# Patient Record
Sex: FEMALE | Race: BLACK OR AFRICAN AMERICAN | NOT HISPANIC OR LATINO | Employment: OTHER
[De-identification: names, ages, dates, MRNs, and addresses within clinical notes are randomized per-mention and may not be internally consistent; named-entity substitution may affect disease eponyms.]

---

## 2017-01-01 ENCOUNTER — PATIENT OUTREACH (OUTPATIENT)
Dept: CASE MANAGEMENT | Facility: OTHER | Age: 62
End: 2017-01-01

## 2017-01-01 ENCOUNTER — OFFICE VISIT (OUTPATIENT)
Dept: INTERNAL MEDICINE | Facility: CLINIC | Age: 62
End: 2017-01-01

## 2017-01-01 ENCOUNTER — HOSPITAL ENCOUNTER (EMERGENCY)
Facility: HOSPITAL | Age: 62
Discharge: ANOTHER HOSPITAL | End: 2017-10-06
Attending: EMERGENCY MEDICINE | Admitting: EMERGENCY MEDICINE

## 2017-01-01 ENCOUNTER — APPOINTMENT (OUTPATIENT)
Dept: GENERAL RADIOLOGY | Facility: HOSPITAL | Age: 62
End: 2017-01-01

## 2017-01-01 ENCOUNTER — EPISODE CHANGES (OUTPATIENT)
Dept: CASE MANAGEMENT | Facility: OTHER | Age: 62
End: 2017-01-01

## 2017-01-01 ENCOUNTER — TELEPHONE (OUTPATIENT)
Dept: INTERNAL MEDICINE | Facility: CLINIC | Age: 62
End: 2017-01-01

## 2017-01-01 VITALS
HEART RATE: 97 BPM | OXYGEN SATURATION: 99 % | TEMPERATURE: 98.4 F | DIASTOLIC BLOOD PRESSURE: 91 MMHG | SYSTOLIC BLOOD PRESSURE: 134 MMHG | RESPIRATION RATE: 18 BRPM | WEIGHT: 145 LBS | HEIGHT: 60 IN | BODY MASS INDEX: 28.47 KG/M2

## 2017-01-01 VITALS
OXYGEN SATURATION: 99 % | SYSTOLIC BLOOD PRESSURE: 122 MMHG | DIASTOLIC BLOOD PRESSURE: 68 MMHG | BODY MASS INDEX: 25.58 KG/M2 | TEMPERATURE: 99.8 F | WEIGHT: 131 LBS | HEART RATE: 105 BPM

## 2017-01-01 VITALS
HEART RATE: 98 BPM | SYSTOLIC BLOOD PRESSURE: 124 MMHG | BODY MASS INDEX: 25.58 KG/M2 | DIASTOLIC BLOOD PRESSURE: 86 MMHG | OXYGEN SATURATION: 99 % | WEIGHT: 131 LBS

## 2017-01-01 VITALS
OXYGEN SATURATION: 99 % | DIASTOLIC BLOOD PRESSURE: 92 MMHG | WEIGHT: 137.6 LBS | HEART RATE: 95 BPM | BODY MASS INDEX: 25.17 KG/M2 | SYSTOLIC BLOOD PRESSURE: 120 MMHG

## 2017-01-01 DIAGNOSIS — J45.30 MILD PERSISTENT ASTHMA WITHOUT COMPLICATION: ICD-10-CM

## 2017-01-01 DIAGNOSIS — IMO0002 UNCONTROLLED TYPE 2 DIABETES MELLITUS WITH OTHER SPECIFIED COMPLICATION, WITHOUT LONG-TERM CURRENT USE OF INSULIN: Primary | ICD-10-CM

## 2017-01-01 DIAGNOSIS — IMO0002 UNCONTROLLED TYPE 2 DIABETES MELLITUS WITH COMPLICATION, WITH LONG-TERM CURRENT USE OF INSULIN: ICD-10-CM

## 2017-01-01 DIAGNOSIS — Z00.00 MEDICARE ANNUAL WELLNESS VISIT, SUBSEQUENT: Primary | ICD-10-CM

## 2017-01-01 DIAGNOSIS — E78.2 MIXED HYPERLIPIDEMIA: ICD-10-CM

## 2017-01-01 DIAGNOSIS — N18.3 ACUTE RENAL FAILURE SUPERIMPOSED ON STAGE 3 CHRONIC KIDNEY DISEASE, UNSPECIFIED ACUTE RENAL FAILURE TYPE: ICD-10-CM

## 2017-01-01 DIAGNOSIS — Z94.1 STATUS POST HEART TRANSPLANT (HCC): ICD-10-CM

## 2017-01-01 DIAGNOSIS — N17.9 ACUTE RENAL FAILURE SUPERIMPOSED ON STAGE 3 CHRONIC KIDNEY DISEASE, UNSPECIFIED ACUTE RENAL FAILURE TYPE: ICD-10-CM

## 2017-01-01 DIAGNOSIS — G43.711 INTRACTABLE CHRONIC MIGRAINE WITHOUT AURA AND WITH STATUS MIGRAINOSUS: ICD-10-CM

## 2017-01-01 DIAGNOSIS — J18.9 PNEUMONIA OF BOTH LUNGS DUE TO INFECTIOUS ORGANISM, UNSPECIFIED PART OF LUNG: Primary | ICD-10-CM

## 2017-01-01 DIAGNOSIS — Z23 FLU VACCINE NEED: ICD-10-CM

## 2017-01-01 DIAGNOSIS — J40 BRONCHITIS: ICD-10-CM

## 2017-01-01 DIAGNOSIS — I50.9 ACUTE CONGESTIVE HEART FAILURE, UNSPECIFIED CONGESTIVE HEART FAILURE TYPE: ICD-10-CM

## 2017-01-01 DIAGNOSIS — I10 BENIGN ESSENTIAL HYPERTENSION: ICD-10-CM

## 2017-01-01 DIAGNOSIS — J01.10 ACUTE FRONTAL SINUSITIS, RECURRENCE NOT SPECIFIED: Primary | ICD-10-CM

## 2017-01-01 DIAGNOSIS — R68.89 FLU-LIKE SYMPTOMS: ICD-10-CM

## 2017-01-01 LAB
ALBUMIN SERPL-MCNC: 4.1 G/DL (ref 3.2–4.8)
ALBUMIN SERPL-MCNC: 4.8 G/DL (ref 3.2–4.8)
ALBUMIN/GLOB SERPL: 1.5 G/DL (ref 1.5–2.5)
ALBUMIN/GLOB SERPL: 1.7 G/DL (ref 1.5–2.5)
ALP SERPL-CCNC: 469 U/L (ref 25–100)
ALP SERPL-CCNC: 485 U/L (ref 25–100)
ALT SERPL W P-5'-P-CCNC: 43 U/L (ref 7–40)
ALT SERPL W P-5'-P-CCNC: 8 U/L (ref 7–40)
ANION GAP SERPL CALCULATED.3IONS-SCNC: 7 MMOL/L (ref 3–11)
ANION GAP SERPL CALCULATED.3IONS-SCNC: 8 MMOL/L (ref 3–11)
ARTICHOKE IGE QN: 66 MG/DL (ref 0–130)
AST SERPL-CCNC: 16 U/L (ref 0–33)
AST SERPL-CCNC: 31 U/L (ref 0–33)
BACTERIA SPEC AEROBE CULT: NORMAL
BACTERIA SPEC AEROBE CULT: NORMAL
BASOPHILS # BLD AUTO: 0.01 10*3/MM3 (ref 0–0.2)
BASOPHILS NFR BLD AUTO: 0.2 % (ref 0–1)
BILIRUB SERPL-MCNC: 0.4 MG/DL (ref 0.3–1.2)
BILIRUB SERPL-MCNC: 0.6 MG/DL (ref 0.3–1.2)
BNP SERPL-MCNC: 691 PG/ML (ref 0–100)
BUN BLD-MCNC: 23 MG/DL (ref 9–23)
BUN BLD-MCNC: 56 MG/DL (ref 9–23)
BUN/CREAT SERPL: 11.5 (ref 7–25)
BUN/CREAT SERPL: 31.1 (ref 7–25)
BURR CELLS BLD QL SMEAR: NORMAL
CALCIUM SPEC-SCNC: 8.9 MG/DL (ref 8.7–10.4)
CALCIUM SPEC-SCNC: 9 MG/DL (ref 8.7–10.4)
CHLORIDE SERPL-SCNC: 107 MMOL/L (ref 99–109)
CHLORIDE SERPL-SCNC: 109 MMOL/L (ref 99–109)
CHOLEST SERPL-MCNC: 192 MG/DL (ref 0–200)
CO2 SERPL-SCNC: 22 MMOL/L (ref 20–31)
CO2 SERPL-SCNC: 29 MMOL/L (ref 20–31)
CREAT BLD-MCNC: 1.8 MG/DL (ref 0.6–1.3)
CREAT BLD-MCNC: 2 MG/DL (ref 0.6–1.3)
D-LACTATE SERPL-SCNC: 0.7 MMOL/L (ref 0.5–2)
DEPRECATED RDW RBC AUTO: 52.3 FL (ref 37–54)
EOSINOPHIL # BLD AUTO: 0.42 10*3/MM3 (ref 0–0.3)
EOSINOPHIL NFR BLD AUTO: 7.4 % (ref 0–3)
ERYTHROCYTE [DISTWIDTH] IN BLOOD BY AUTOMATED COUNT: 15.4 % (ref 11.3–14.5)
EXPIRATION DATE: NORMAL
FLUAV AG NPH QL: NEGATIVE
FLUBV AG NPH QL: NEGATIVE
GFR SERPL CREATININE-BSD FRML MDRD: 31 ML/MIN/1.73
GFR SERPL CREATININE-BSD FRML MDRD: 35 ML/MIN/1.73
GLOBULIN UR ELPH-MCNC: 2.8 GM/DL
GLOBULIN UR ELPH-MCNC: 2.9 GM/DL
GLUCOSE BLD-MCNC: 126 MG/DL (ref 70–100)
GLUCOSE BLD-MCNC: 156 MG/DL (ref 70–100)
HBA1C MFR BLD: 7.7 %
HBA1C MFR BLD: 8.6 % (ref 4.8–5.6)
HCT VFR BLD AUTO: 33.7 % (ref 34.5–44)
HDLC SERPL-MCNC: 101 MG/DL (ref 40–60)
HGB BLD-MCNC: 10 G/DL (ref 11.5–15.5)
HOLD SPECIMEN: NORMAL
HOLD SPECIMEN: NORMAL
HYPOCHROMIA BLD QL: NORMAL
IMM GRANULOCYTES # BLD: 0.01 10*3/MM3 (ref 0–0.03)
IMM GRANULOCYTES NFR BLD: 0.2 % (ref 0–0.6)
INTERNAL CONTROL: NORMAL
LYMPHOCYTES # BLD AUTO: 0.4 10*3/MM3 (ref 0.6–4.8)
LYMPHOCYTES NFR BLD AUTO: 7 % (ref 24–44)
Lab: NORMAL
MCH RBC QN AUTO: 27.2 PG (ref 27–31)
MCHC RBC AUTO-ENTMCNC: 29.7 G/DL (ref 32–36)
MCV RBC AUTO: 91.8 FL (ref 80–99)
MONOCYTES # BLD AUTO: 0.97 10*3/MM3 (ref 0–1)
MONOCYTES NFR BLD AUTO: 17 % (ref 0–12)
NEUTROPHILS # BLD AUTO: 3.89 10*3/MM3 (ref 1.5–8.3)
NEUTROPHILS NFR BLD AUTO: 68.2 % (ref 41–71)
OVALOCYTES BLD QL SMEAR: NORMAL
PLAT MORPH BLD: NORMAL
PLATELET # BLD AUTO: 193 10*3/MM3 (ref 150–450)
PMV BLD AUTO: 10.8 FL (ref 6–12)
POTASSIUM BLD-SCNC: 4.4 MMOL/L (ref 3.5–5.5)
POTASSIUM BLD-SCNC: 5.1 MMOL/L (ref 3.5–5.5)
PROT SERPL-MCNC: 6.9 G/DL (ref 5.7–8.2)
PROT SERPL-MCNC: 7.7 G/DL (ref 5.7–8.2)
RBC # BLD AUTO: 3.67 10*6/MM3 (ref 3.89–5.14)
SODIUM BLD-SCNC: 138 MMOL/L (ref 132–146)
SODIUM BLD-SCNC: 144 MMOL/L (ref 132–146)
TRIGL SERPL-MCNC: 76 MG/DL (ref 0–150)
TROPONIN I SERPL-MCNC: 0.01 NG/ML (ref 0–0.07)
WBC MORPH BLD: NORMAL
WBC NRBC COR # BLD: 5.7 10*3/MM3 (ref 3.5–10.8)
WHOLE BLOOD HOLD SPECIMEN: NORMAL
WHOLE BLOOD HOLD SPECIMEN: NORMAL

## 2017-01-01 PROCEDURE — 99213 OFFICE O/P EST LOW 20 MIN: CPT | Performed by: PHYSICIAN ASSISTANT

## 2017-01-01 PROCEDURE — 90686 IIV4 VACC NO PRSV 0.5 ML IM: CPT | Performed by: INTERNAL MEDICINE

## 2017-01-01 PROCEDURE — 84484 ASSAY OF TROPONIN QUANT: CPT

## 2017-01-01 PROCEDURE — G0008 ADMIN INFLUENZA VIRUS VAC: HCPCS | Performed by: INTERNAL MEDICINE

## 2017-01-01 PROCEDURE — 83036 HEMOGLOBIN GLYCOSYLATED A1C: CPT | Performed by: INTERNAL MEDICINE

## 2017-01-01 PROCEDURE — 36415 COLL VENOUS BLD VENIPUNCTURE: CPT

## 2017-01-01 PROCEDURE — 83605 ASSAY OF LACTIC ACID: CPT | Performed by: EMERGENCY MEDICINE

## 2017-01-01 PROCEDURE — G0439 PPPS, SUBSEQ VISIT: HCPCS | Performed by: INTERNAL MEDICINE

## 2017-01-01 PROCEDURE — 96372 THER/PROPH/DIAG INJ SC/IM: CPT | Performed by: PHYSICIAN ASSISTANT

## 2017-01-01 PROCEDURE — 96365 THER/PROPH/DIAG IV INF INIT: CPT

## 2017-01-01 PROCEDURE — 25010000002 ONDANSETRON PER 1 MG: Performed by: EMERGENCY MEDICINE

## 2017-01-01 PROCEDURE — 85025 COMPLETE CBC W/AUTO DIFF WBC: CPT | Performed by: EMERGENCY MEDICINE

## 2017-01-01 PROCEDURE — G0444 DEPRESSION SCREEN ANNUAL: HCPCS | Performed by: INTERNAL MEDICINE

## 2017-01-01 PROCEDURE — 87040 BLOOD CULTURE FOR BACTERIA: CPT | Performed by: EMERGENCY MEDICINE

## 2017-01-01 PROCEDURE — 93005 ELECTROCARDIOGRAM TRACING: CPT

## 2017-01-01 PROCEDURE — 96368 THER/DIAG CONCURRENT INF: CPT

## 2017-01-01 PROCEDURE — 96372 THER/PROPH/DIAG INJ SC/IM: CPT | Performed by: INTERNAL MEDICINE

## 2017-01-01 PROCEDURE — 80053 COMPREHEN METABOLIC PANEL: CPT | Performed by: INTERNAL MEDICINE

## 2017-01-01 PROCEDURE — 71010 HC CHEST PA OR AP: CPT

## 2017-01-01 PROCEDURE — 25010000002 PIPERACILLIN-TAZOBACTAM: Performed by: EMERGENCY MEDICINE

## 2017-01-01 PROCEDURE — 99213 OFFICE O/P EST LOW 20 MIN: CPT | Performed by: INTERNAL MEDICINE

## 2017-01-01 PROCEDURE — 80061 LIPID PANEL: CPT | Performed by: INTERNAL MEDICINE

## 2017-01-01 PROCEDURE — 96361 HYDRATE IV INFUSION ADD-ON: CPT

## 2017-01-01 PROCEDURE — 94640 AIRWAY INHALATION TREATMENT: CPT

## 2017-01-01 PROCEDURE — 25010000002 AZITHROMYCIN: Performed by: EMERGENCY MEDICINE

## 2017-01-01 PROCEDURE — 25010000002 HYDROCORTISONE SODIUM SUCCINATE 100 MG RECONSTITUTED SOLUTION: Performed by: EMERGENCY MEDICINE

## 2017-01-01 PROCEDURE — 87804 INFLUENZA ASSAY W/OPTIC: CPT | Performed by: PHYSICIAN ASSISTANT

## 2017-01-01 PROCEDURE — 96375 TX/PRO/DX INJ NEW DRUG ADDON: CPT

## 2017-01-01 PROCEDURE — 99284 EMERGENCY DEPT VISIT MOD MDM: CPT

## 2017-01-01 PROCEDURE — 85007 BL SMEAR W/DIFF WBC COUNT: CPT | Performed by: EMERGENCY MEDICINE

## 2017-01-01 PROCEDURE — 83880 ASSAY OF NATRIURETIC PEPTIDE: CPT | Performed by: EMERGENCY MEDICINE

## 2017-01-01 PROCEDURE — 80053 COMPREHEN METABOLIC PANEL: CPT | Performed by: EMERGENCY MEDICINE

## 2017-01-01 RX ORDER — IPRATROPIUM BROMIDE AND ALBUTEROL SULFATE 2.5; .5 MG/3ML; MG/3ML
3 SOLUTION RESPIRATORY (INHALATION) ONCE
Status: COMPLETED | OUTPATIENT
Start: 2017-01-01 | End: 2017-01-01

## 2017-01-01 RX ORDER — LANCETS 28 GAUGE
1 EACH MISCELLANEOUS 2 TIMES DAILY
Qty: 200 EACH | Refills: 3 | Status: SHIPPED | OUTPATIENT
Start: 2017-01-01 | End: 2017-01-01 | Stop reason: SDUPTHER

## 2017-01-01 RX ORDER — ALBUTEROL SULFATE 90 UG/1
2 AEROSOL, METERED RESPIRATORY (INHALATION) EVERY 6 HOURS PRN
Qty: 18 G | Refills: 0 | Status: SHIPPED | OUTPATIENT
Start: 2017-01-01

## 2017-01-01 RX ORDER — AZITHROMYCIN 250 MG/1
TABLET, FILM COATED ORAL
Qty: 6 TABLET | Refills: 0 | Status: SHIPPED | OUTPATIENT
Start: 2017-01-01

## 2017-01-01 RX ORDER — ONDANSETRON 2 MG/ML
4 INJECTION INTRAMUSCULAR; INTRAVENOUS ONCE
Status: COMPLETED | OUTPATIENT
Start: 2017-01-01 | End: 2017-01-01

## 2017-01-01 RX ORDER — BLOOD-GLUCOSE METER
KIT MISCELLANEOUS
Qty: 1 EACH | Refills: 2 | Status: SHIPPED | OUTPATIENT
Start: 2017-01-01

## 2017-01-01 RX ORDER — SODIUM CHLORIDE 0.9 % (FLUSH) 0.9 %
10 SYRINGE (ML) INJECTION AS NEEDED
Status: DISCONTINUED | OUTPATIENT
Start: 2017-01-01 | End: 2017-01-01 | Stop reason: HOSPADM

## 2017-01-01 RX ORDER — VANCOMYCIN HYDROCHLORIDE
20 ONCE
Status: DISCONTINUED | OUTPATIENT
Start: 2017-01-01 | End: 2017-01-01 | Stop reason: HOSPADM

## 2017-01-01 RX ORDER — METHYLPREDNISOLONE ACETATE 40 MG/ML
40 INJECTION, SUSPENSION INTRA-ARTICULAR; INTRALESIONAL; INTRAMUSCULAR; SOFT TISSUE ONCE
Status: COMPLETED | OUTPATIENT
Start: 2017-01-01 | End: 2017-01-01

## 2017-01-01 RX ORDER — SIROLIMUS 1 MG/1
2 TABLET, FILM COATED ORAL DAILY
COMMUNITY

## 2017-01-01 RX ORDER — SODIUM CHLORIDE 9 MG/ML
125 INJECTION, SOLUTION INTRAVENOUS CONTINUOUS
Status: DISCONTINUED | OUTPATIENT
Start: 2017-01-01 | End: 2017-01-01 | Stop reason: HOSPADM

## 2017-01-01 RX ORDER — LANCETS 28 GAUGE
1 EACH MISCELLANEOUS 2 TIMES DAILY
Qty: 200 EACH | Refills: 3 | Status: SHIPPED | OUTPATIENT
Start: 2017-01-01

## 2017-01-01 RX ORDER — MULTIVITAMIN WITH IRON
1 TABLET ORAL DAILY
Qty: 30 EACH | Refills: 1 | Status: SHIPPED | OUTPATIENT
Start: 2017-01-01

## 2017-01-01 RX ADMIN — METHYLPREDNISOLONE ACETATE 40 MG: 40 INJECTION, SUSPENSION INTRA-ARTICULAR; INTRALESIONAL; INTRAMUSCULAR; SOFT TISSUE at 14:40

## 2017-01-01 RX ADMIN — AZITHROMYCIN 500 MG: 500 INJECTION, POWDER, LYOPHILIZED, FOR SOLUTION INTRAVENOUS at 18:11

## 2017-01-01 RX ADMIN — TAZOBACTAM SODIUM AND PIPERACILLIN SODIUM 4.5 G: .5; 4 INJECTION, POWDER, LYOPHILIZED, FOR SOLUTION INTRAVENOUS at 17:35

## 2017-01-01 RX ADMIN — SODIUM CHLORIDE 125 ML/HR: 9 INJECTION, SOLUTION INTRAVENOUS at 16:41

## 2017-01-01 RX ADMIN — ONDANSETRON 4 MG: 2 INJECTION INTRAMUSCULAR; INTRAVENOUS at 16:37

## 2017-01-01 RX ADMIN — HYDROCORTISONE SODIUM SUCCINATE 100 MG: 100 INJECTION, POWDER, FOR SOLUTION INTRAMUSCULAR; INTRAVENOUS at 16:41

## 2017-01-01 RX ADMIN — IPRATROPIUM BROMIDE AND ALBUTEROL SULFATE 3 ML: .5; 3 SOLUTION RESPIRATORY (INHALATION) at 16:09

## 2017-01-01 RX ADMIN — SODIUM CHLORIDE 500 ML: 9 INJECTION, SOLUTION INTRAVENOUS at 16:42

## 2017-01-01 RX ADMIN — METHYLPREDNISOLONE ACETATE 40 MG: 40 INJECTION, SUSPENSION INTRA-ARTICULAR; INTRALESIONAL; INTRAMUSCULAR; SOFT TISSUE at 15:36

## 2017-07-11 NOTE — PROGRESS NOTES
Diabetes    Subjective   Pamella Henson is a 62 y.o. female is here today for follow-up.    History of Present Illness   Has had LHC and stents as she was fatigued and having left sided pain, 2 stents this year, and 1 stent last year.  Having a HA x 2 days, not getting better- rt side of the face behind the eye.  Issues with allergies also.    Current Outpatient Prescriptions:   •  albuterol (PROVENTIL HFA;VENTOLIN HFA) 108 (90 BASE) MCG/ACT inhaler, Inhale 2 puffs every 6 (six) hours as needed for wheezing., Disp: 18 g, Rfl: 0  •  amLODIPine (NORVASC) 5 MG tablet, Take  by mouth., Disp: , Rfl:   •  aspirin (ASPIRIN EC LO-DOSE) 81 MG EC tablet, Take  by mouth., Disp: , Rfl:   •  azelastine (ASTELIN) 0.1 % nasal spray, into each nostril., Disp: , Rfl:   •  bumetanide (BUMEX) 1 MG tablet, Take  by mouth., Disp: , Rfl:   •  Cholecalciferol (VITAMIN D3) 5000 UNITS capsule capsule, Take 1 capsule by mouth Daily., Disp: 30 capsule, Rfl: 5  •  clopidogrel (PLAVIX) 75 MG tablet, Take 75 mg by mouth daily., Disp: , Rfl:   •  estradiol (VIVELLE-DOT) 0.0375 MG/24HR, Place  on the skin., Disp: , Rfl:   •  fluticasone (FLONASE) 50 MCG/ACT nasal spray, into each nostril., Disp: , Rfl:   •  fluticasone-salmeterol (ADVAIR DISKUS) 250-50 MCG/DOSE DISKUS, every 12 (twelve) hours., Disp: , Rfl:   •  glucose blood test strip, FreeStyle Test In Vitro Strip; Patient Sig: FreeStyle Test In Vitro Strip TEST THREE TIMES A DAY **dx code E11.55**; 400; 0; 18-Dec-2012; Active; DX CODE --  E11.65, Disp: , Rfl:   •  Insulin Glargine (LANTUS SOLOSTAR) 100 UNIT/ML injection pen, Inject 36 Units under the skin every night., Disp: 4 pen, Rfl: 5  •  Insulin Lispro, Human, (HUMALOG KWIKPEN) 100 UNIT/ML solution pen-injector, Inject  under the skin., Disp: , Rfl:   •  Insulin Pen Needle (CLICKFINE PEN NEEDLES) 31G X 6 MM misc, , Disp: , Rfl:   •  Lancets (FREESTYLE) lancets, , Disp: , Rfl:   •  lansoprazole (PREVACID) 30 MG capsule, Take  by mouth  daily., Disp: , Rfl:   •  metoprolol tartrate (LOPRESSOR) 100 MG tablet, Take 100 mg by mouth 2 (two) times a day., Disp: , Rfl:   •  montelukast (SINGULAIR) 10 MG tablet, Take  by mouth., Disp: , Rfl:   •  potassium chloride (K-DUR,KLOR-CON) 20 MEQ CR tablet, Take  by mouth., Disp: , Rfl:   •  predniSONE (DELTASONE) 2.5 MG tablet, Take  by mouth daily., Disp: , Rfl:   •  promethazine (PHENERGAN) 25 MG tablet, Take  by mouth., Disp: , Rfl:   •  tacrolimus (PROGRAF) 1 MG capsule, Take  by mouth., Disp: , Rfl:   •  Magnesium 250 MG tablet, Take 1 tablet by mouth Daily., Disp: 30 each, Rfl: 1  •  Riboflavin 100 MG capsule, Take 1 capsule by mouth Daily., Disp: 30 each, Rfl: 2    Current Facility-Administered Medications:   •  methylPREDNISolone acetate (DEPO-medrol) injection 40 mg, 40 mg, Intramuscular, Once, Abbey Blanca MD      The following portions of the patient's history were reviewed and updated as appropriate: allergies, current medications, past family history, past medical history, past social history, past surgical history and problem list.    Review of Systems   Constitutional: Positive for fatigue. Negative for chills and fever.   HENT: Negative for ear discharge, ear pain, sinus pressure and sore throat.    Respiratory: Negative for cough, chest tightness and shortness of breath.    Cardiovascular: Negative for chest pain, palpitations and leg swelling.   Gastrointestinal: Negative for diarrhea, nausea and vomiting.   Musculoskeletal: Negative for arthralgias, back pain and myalgias.   Neurological: Positive for headaches. Negative for dizziness and syncope.   Psychiatric/Behavioral: Negative for confusion and sleep disturbance.       Objective   /92  Pulse 95  Wt 137 lb 9.6 oz (62.4 kg)  SpO2 99% Comment: ra  BMI 25.17 kg/m2  Physical Exam   Constitutional: She is oriented to person, place, and time. She appears well-developed and well-nourished.   HENT:   Head: Normocephalic and  atraumatic.   Mouth/Throat: No oropharyngeal exudate.   Eyes: Conjunctivae are normal. Pupils are equal, round, and reactive to light.   Neck: Neck supple. No thyromegaly present.   Cardiovascular: Normal rate and regular rhythm.    Pulmonary/Chest: Effort normal and breath sounds normal.   Abdominal: Soft. Bowel sounds are normal. She exhibits no distension. There is no tenderness.   Musculoskeletal: She exhibits edema.   Neurological: She is alert and oriented to person, place, and time.   Skin: Skin is warm and dry.   Psychiatric: She has a normal mood and affect. Judgment normal.   Nursing note and vitals reviewed.        Results for orders placed or performed in visit on 07/11/17   POC Glycosylated Hemoglobin (Hb A1C)   Result Value Ref Range    Hemoglobin A1C 7.7 %             Assessment/Plan   Diagnoses and all orders for this visit:    Uncontrolled type 2 diabetes mellitus with other specified complication, without long-term current use of insulin  -     POC Glycosylated Hemoglobin (Hb A1C)    Intractable chronic migraine without aura and with status migrainosus  -     methylPREDNISolone acetate (DEPO-medrol) injection 40 mg; Inject 1 mL into the shoulder, thigh, or buttocks 1 (One) Time.  -     Magnesium 250 MG tablet; Take 1 tablet by mouth Daily.  -     Riboflavin 100 MG capsule; Take 1 capsule by mouth Daily.  -     Cholecalciferol (VITAMIN D3) 5000 UNITS capsule capsule; Take 1 capsule by mouth Daily.             Labs per [facility].    Return in about 4 months (around 11/11/2017) for Medicare Wellness.

## 2017-07-12 NOTE — TELEPHONE ENCOUNTER
ELY OLMEDO WITH [facility] CALLED STATING THAT THEY NEED A NEW RX FOR TEST STRIPS AND LANCETS WITH THE DX CODE ON THE HARD COPY PER MEDICARE SENT TO THEM.

## 2017-10-06 NOTE — ED PROVIDER NOTES
Subjective   HPI Comments: Pamella Henson is a 62 y.o. pleasant female who presents to the ED with complaints of shortness of breath and cough that began 3-4 days ago. She reports that for the last 3-4 nava she has been feeling SoA and weak with a cough and headache so she visited a [facility] two days ago, where they stated that she showed signs of a URI and she has been taking Zithromax and Perles since then. Upon returning home, she states that she has not felt any better, and could not sleep last night. She also states that the last time she was able to ambulate normally in a grocery store was 5 days ago. She also complains of chills, a decreased appetite, a non-productive cough, rhinorhea, and vomiting, but denies diarrhea, abdominal pain, ankle pain, and changes in weight. She has a history of heart failure, cardiac stents, and had a heart transplant 14 years ago. She has no hx of sleep apnea. Additionally, she does no wear oxygen at home. No other acute complaints at this time.    Patient is a 62 y.o. female presenting with shortness of breath.   History provided by:  Patient and relative  Shortness of Breath   Severity:  Moderate  Onset quality:  Gradual  Duration:  4 days  Timing:  Constant  Progression:  Unchanged  Chronicity:  New  Relieved by:  Nothing  Ineffective treatments: Zithromax and Perles.  Associated symptoms: cough, headaches and vomiting    Associated symptoms: no abdominal pain and no fever    Cough:     Severity:  Moderate    Onset quality:  Sudden    Duration:  4 days    Timing:  Constant    Progression:  Unchanged    Chronicity:  New  Headaches:     Severity:  Mild    Onset quality:  Gradual    Duration:  4 days    Timing:  Constant    Progression:  Unchanged  Vomiting:     Severity:  Moderate    Duration:  1 day    Timing:  Constant    Progression:  Unchanged  Risk factors comment:  Heart transplant 14 years ago    Review of past records  4:05 PM  Ms. Henson visited the ED on the 4th Of October  - [facility] -Negative Flu swab    Review of Systems   Constitutional: Positive for appetite change (Decreased appetite) and chills. Negative for fever and unexpected weight change.   HENT: Positive for rhinorrhea.    Respiratory: Positive for cough and shortness of breath.    Cardiovascular: Negative for leg swelling.   Gastrointestinal: Positive for nausea and vomiting. Negative for abdominal pain, blood in stool and diarrhea.   Genitourinary: Negative for dysuria.   Neurological: Positive for weakness and headaches.   All other systems reviewed and are negative.    04.Medical Hx:   DVT following heart transplant in 2004:   diabetes mellitus:   chronic renal insufficiency:   deep vein thrombosis:   heart transplant in 2004 d/t NICM:   02. Active Dx:   Myopathy: Description: dilated idiopathic   04. Surgical Hx:   Heart transplant: Description: Patient CMV-Negative, (CAD) Donor CMV: Neg   Tricuspid valve repair:   Hysterectomy:     Past Medical History:   Diagnosis Date   • Diabetes mellitus    • Hypertension        Allergies   Allergen Reactions   • Ceftriaxone    • Contrast Dye        Past Surgical History:   Procedure Laterality Date   • CORONARY ANGIOPLASTY WITH STENT PLACEMENT  2016   • HEART TRANSPLANT  2004   • HYSTERECTOMY     • TRICUSPID VALVE REPLACEMENT  2006       History reviewed. No pertinent family history.    Social History     Social History   • Marital status: Single     Spouse name: N/A   • Number of children: N/A   • Years of education: N/A     Social History Main Topics   • Smoking status: Never Smoker   • Smokeless tobacco: Never Used   • Alcohol use Yes      Comment: wine on occasion   • Drug use: No   • Sexual activity: Defer     Other Topics Concern   • None     Social History Narrative           Objective   Physical Exam   Constitutional: She is oriented to person, place, and time. Vital signs are normal. She appears well-developed and well-nourished.   Patient appears slightly dyspneic at rest  and has a dry cough.   HENT:   Head: Normocephalic and atraumatic.   Nose: Nose normal.   Mouth/Throat: Mucous membranes are dry.   Tongue is slightly coated.   Eyes: Conjunctivae are normal. No scleral icterus.   Neck: Normal range of motion. Neck supple. No JVD (No JVP) present. Carotid bruit is not present. No edema present.   Cardiovascular: Normal rate and regular rhythm.  Exam reveals distant heart sounds.    No murmur heard.  Pulmonary/Chest: Effort normal. She has rales (Crackles in bilateral bases).   Abdominal: Soft. Bowel sounds are normal. She exhibits distension (Slightly distended). There is no rebound and no guarding.   Musculoskeletal: Normal range of motion. She exhibits no edema or tenderness.   Lymphadenopathy:     She has no cervical adenopathy.   Neurological: She is alert and oriented to person, place, and time.   Moderate global weakness.   Skin: Skin is warm and dry. No rash noted.   Psychiatric: She has a normal mood and affect. Her behavior is normal.   Nursing note and vitals reviewed.      Critical Care  Performed by: DOMINGO WEBER  Authorized by: DOMINGO WEBER   Total critical care time: 35 minutes  Critical care was necessary to treat or prevent imminent or life-threatening deterioration of the following conditions: Heart failure and pnemonial hypoxia   Critical care was time spent personally by me on the following activities: discussions with consultants, evaluation of patient's response to treatment, obtaining history from patient or surrogate, ordering and review of laboratory studies, review of old charts, discussions with primary provider, ordering and performing treatments and interventions, re-evaluation of patient's condition, development of treatment plan with patient or surrogate, examination of patient, ordering and review of radiographic studies and pulse oximetry.               ED Course  ED Course     No results found for this or any previous visit (from the past 24  hour(s)).  Note: In addition to lab results from this visit, the labs listed above may include labs taken at another facility or during a different encounter within the last 24 hours. Please correlate lab times with ED admission and discharge times for further clarification of the services performed during this visit.    XR Chest 1 View   Final Result   Cardiomegaly with increased central pulmonary vascularity.   Ill-defined opacifications within the right mid and upper lung   concerning for acute infiltrates.       D:  10/06/2017   E:  10/06/2017       This report was finalized on 10/6/2017 4:10 PM by Dr. eJff Chaves.            Vitals:    10/06/17 1700 10/06/17 1730 10/06/17 1822 10/06/17 1829   BP: 128/84 130/90 134/91    BP Location:       Patient Position:       Pulse: 97 96 97    Resp:    18   Temp:   98.4 °F (36.9 °C)    TempSrc:   Oral    SpO2: 98% 99% 99%    Weight:       Height:         Medications   piperacillin-tazobactam (ZOSYN) 4.5 g/100 mL 0.9% NS IVPB (mbp) (0 g Intravenous Transferred to External Facility 10/6/17 1836)   AZITHROMYCIN 500 MG/250 ML 0.9% NS IVPB (MBP) (0 mg Intravenous Transferred to External Facility 10/6/17 1837)   ipratropium-albuterol (DUO-NEB) nebulizer solution 3 mL (3 mL Nebulization Given 10/6/17 1609)   sodium chloride 0.9 % bolus 500 mL (0 mL Intravenous Stopped 10/6/17 1810)   ondansetron (ZOFRAN) injection 4 mg (4 mg Intravenous Given 10/6/17 1637)   hydrocortisone sodium succinate (Solu-CORTEF) injection 100 mg (100 mg Intravenous Given 10/6/17 1641)     ECG/EMG Results (last 24 hours)     Procedure Component Value Units Date/Time    ECG 12 Lead [[id number]] Collected:  10/06/17 1408     Updated:  10/06/17 1628    Narrative:       Test Reason : SOA Protocol  Blood Pressure : **/** mmHG  Vent. Rate : 095 BPM     Atrial Rate : 095 BPM     P-R Int : 172 ms          QRS Dur : 102 ms      QT Int : 372 ms       P-R-T Axes : 062 119 -12 degrees     QTc Int : 467 ms    Normal  sinus rhythm  Incomplete right bundle branch block  Left posterior fascicular block  Possible Anterior infarct (cited on or before 27-JAN-2013)  Abnormal ECG  When compared with ECG of 27-JAN-2013 18:05,  Borderline criteria for Inferior infarct are no longer present  Questionable change in initial forces of Anterior leads  T wave inversion no longer evident in Anterolateral leads  Confirmed by DOMINGO WEBER MD ([id number]) on 10/6/2017 4:28:53 PM    Referred By:  EDMD           Confirmed By:DOMINGO WEBER MD                       MDM  Number of Diagnoses or Management Options  Acute congestive heart failure, unspecified congestive heart failure type:   Acute renal failure superimposed on stage 3 chronic kidney disease, unspecified acute renal failure type:   Pneumonia of both lungs due to infectious organism, unspecified part of lung:   Status post heart transplant:   Diagnosis management comments:       This is a very pleasant, medically complex 62-year-old female who is not on home oxygen.  She is status post cardiac transplant 13 years ago for dilated cardiomyopathy.    She presents today with increasing shortness of breath.  At her baseline she is ambulatory and out about but since Sunday she has gotten progressively weaker and now is essentially house and, almost bed, bound.    She is hypoxic here.  She has stigmata of pneumonia on her chest x-ray and also heart failure with an elevated BNP and worsening renal failure.    She is hemodynamically stable.    I've started broad-spectrum IV antibiotics.  I've given her stress dose of steroids. I gave her aggressive pulmonary toilet but unfortunately she is unable to produce sputum for us.    I think she is best managed in a transplant center that has experienced dealing with cardiac transplant patients who are quite ill.  I spoke to Dr. Campo, on-call  for her primary transplant doctor and he's graciously accepted the patient.    I discussed my rationale regarding  transfer with the patient's daughter and the patient and they all agree.    She'll be transported via ambulance.    All are agreeable with the plan.       Amount and/or Complexity of Data Reviewed  Clinical lab tests: reviewed  Tests in the radiology section of CPT®: reviewed  Tests in the medicine section of CPT®: reviewed  Decide to obtain previous medical records or to obtain history from someone other than the patient: yes    Critical Care  Total time providing critical care: 30-74 minutes      Final diagnoses:   Pneumonia of both lungs due to infectious organism, unspecified part of lung   Acute congestive heart failure, unspecified congestive heart failure type   Acute renal failure superimposed on stage 3 chronic kidney disease, unspecified acute renal failure type   Status post heart transplant     EMR Dragon/Transcription disclaimer:  Much of this encounter note is an electronic transcription/translation of spoken language to printed text. The electronic translation of spoken language may permit erroneous, or at times, nonsensical words or phrases to be inadvertently transcribed; Although I have reviewed the note for such errors, some may still exist.       Lewis MORRISON Marjan  10/06/17 1632       Lewis MORRISON Marjan  10/06/17 1700       Sada Louis  10/06/17 1730       Lewis MORRISON Dayaram  10/06/17 1803       Sada H Missy  10/06/17 1831       Sada Louis  10/06/17 1836       Toño Pillai MD  10/08/17 [id number]

## 2017-11-07 NOTE — PROGRESS NOTES
QUICK REFERENCE INFORMATION:  The ABCs of the Annual Wellness Visit    Subsequent Medicare Wellness Visit    HEALTH RISK ASSESSMENT    1955    Recent Hospitalizations:  Recently treated at the following:  Other: [facility] ,on 10/21 with CHF, and THAIS.  D/debby on higher prednisone dose, and med changes. Followed by Dr. Hand      Current Medical Providers:  Patient Care Team:  Abbey Blanca MD as PCP - General  Abbey Blanca MD as PCP - Family Medicine  Yazan Lyons MD as PCP - Claims Attributed  Jean Bradley RN as Care Coordinator (Population Health)        Smoking Status:  History   Smoking Status   • Never Smoker   Smokeless Tobacco   • Never Used       Alcohol Consumption:  History   Alcohol Use   • Yes     Comment: wine on occasion       Depression Screen:   PHQ-2/PHQ-9 Depression Screening 11/7/2017   Little interest or pleasure in doing things 0   Feeling down, depressed, or hopeless 0   Total Score 0       Health Habits and Functional and Cognitive Screening:  Functional & Cognitive Status 11/7/2017   Do you have difficulty preparing food and eating? No   Do you have difficulty bathing yourself, getting dressed or grooming yourself? No   Do you have difficulty using the toilet? No   Do you have difficulty moving around from place to place? No   Do you have trouble with steps or getting out of a bed or a chair? No   In the past year have you fallen or experienced a near fall? No   Current Diet Well Balanced Diet   Dental Exam Up to date   Eye Exam Up to date   Exercise (times per week) 0 times per week   Current Exercise Activities Include None   Do you need help using the phone?  No   Are you deaf or do you have serious difficulty hearing?  No   Do you need help with transportation? No   Do you need help shopping? No   Do you need help preparing meals?  No   Do you need help with housework?  No   Do you need help with laundry? No   Do you need help taking your medications? No   Do you  need help managing money? No   Have you felt unusual stress, anger or loneliness in the last month? No   Who do you live with? Alone   If you need help, do you have trouble finding someone available to you? Yes   Have you been bothered in the last four weeks by sexual problems? No   Do you have difficulty concentrating, remembering or making decisions? No           Does the patient have evidence of cognitive impairment? No    Aspirin use counseling: Start ASA 81 mg daily       Recent Lab Results:  CMP:  Lab Results   Component Value Date    BUN 56 (H) 11/07/2017    CREATININE 1.80 (H) 11/07/2017    EGFRIFAFRI 35 (L) 11/07/2017    BCR 31.1 (H) 11/07/2017     11/07/2017    K 4.4 11/07/2017    CO2 29.0 11/07/2017    CALCIUM 9.0 11/07/2017    ALBUMIN 4.80 11/07/2017    LABIL2 1.7 11/07/2017    BILITOT 0.4 11/07/2017    ALKPHOS 485 (H) 11/07/2017    AST 31 11/07/2017    ALT 43 (H) 11/07/2017     Lipid Panel:  Lab Results   Component Value Date    CHOL 192 11/07/2017    TRIG 76 11/07/2017     (H) 11/07/2017    LDLDIRECT 66 11/07/2017     HbA1c:  Lab Results   Component Value Date    HGBA1C 8.60 (H) 11/07/2017       Visual Acuity:  No exam data present    Age-appropriate Screening Schedule:  Refer to the list below for future screening recommendations based on patient's age, sex and/or medical conditions. Orders for these recommended tests are listed in the plan section. The patient has been provided with a written plan.    Health Maintenance   Topic Date Due   • DIABETIC EYE EXAM  04/18/2018 (Originally 7/11/2017)   • MAMMOGRAM  04/19/2018 (Originally 6/20/2016)   • HEMOGLOBIN A1C  05/07/2018   • PAP SMEAR  10/10/2018   • DIABETIC FOOT EXAM  11/07/2018   • LIPID PANEL  11/07/2018   • URINE MICROALBUMIN  11/07/2018   • COLONOSCOPY  12/16/2025   • TDAP/TD VACCINES (2 - Td) 08/01/2026   • PNEUMOCOCCAL VACCINE (19-64 MEDIUM RISK)  Completed   • INFLUENZA VACCINE  Completed   • ZOSTER VACCINE  Excluded         Subjective   History of Present Illness    Pamella Henson is a 62 y.o. female who presents for an Subsequent Wellness Visit and follow up on her DM2, HTN, HLP and reports she is s/p recent admission to  for her heart. Also had THAIS , hence  Getting close eval by the transplant team.    The following portions of the patient's history were reviewed and updated as appropriate: allergies, current medications, past family history, past medical history, past social history, past surgical history and problem list.    Outpatient Medications Prior to Visit   Medication Sig Dispense Refill   • albuterol (PROVENTIL HFA;VENTOLIN HFA) 108 (90 BASE) MCG/ACT inhaler Inhale 2 puffs Every 6 (Six) Hours As Needed for Wheezing. 18 g 0   • amLODIPine (NORVASC) 5 MG tablet Take  by mouth.     • aspirin (ASPIRIN EC LO-DOSE) 81 MG EC tablet Take  by mouth.     • azelastine (ASTELIN) 0.1 % nasal spray into each nostril.     • azithromycin (ZITHROMAX) 250 MG tablet Take 2 tablets the first day, then 1 tablet daily for 4 days. 6 tablet 0   • benzonatate (TESSALON) 200 MG capsule Take 1 capsule by mouth 3 (Three) Times a Day As Needed for Cough. 30 capsule 0   • bumetanide (BUMEX) 1 MG tablet Take  by mouth.     • Cholecalciferol (VITAMIN D3) 5000 UNITS capsule capsule Take 1 capsule by mouth Daily. 30 capsule 5   • clopidogrel (PLAVIX) 75 MG tablet Take 75 mg by mouth daily.     • estradiol (VIVELLE-DOT) 0.0375 MG/24HR Place  on the skin.     • fluticasone (FLONASE) 50 MCG/ACT nasal spray into each nostril.     • fluticasone-salmeterol (ADVAIR DISKUS) 250-50 MCG/DOSE DISKUS Inhale 1 puff Every 12 (Twelve) Hours. 60 each 5   • lansoprazole (PREVACID) 30 MG capsule Take  by mouth daily.     • Magnesium 250 MG tablet Take 1 tablet by mouth Daily. 30 each 1   • metoprolol tartrate (LOPRESSOR) 100 MG tablet Take 100 mg by mouth 2 (two) times a day.     • montelukast (SINGULAIR) 10 MG tablet Take  by mouth.     • potassium chloride  (K-DUR,KLOR-CON) 20 MEQ CR tablet Take  by mouth.     • predniSONE (DELTASONE) 2.5 MG tablet Take 2 mg by mouth Daily.     • promethazine (PHENERGAN) 25 MG tablet Take  by mouth.     • Riboflavin 100 MG capsule Take 1 capsule by mouth Daily. 30 each 2   • tacrolimus (PROGRAF) 1 MG capsule Take  by mouth.     • glucose blood test strip 1 each by Other route 2 (Two) Times a Day. DX E11.65 200 each 3   • insulin aspart (novoLOG) 100 UNIT/ML injection 5 Units before breakfast and lunch, 10 units before dinner & per sliding scale 5 each 5   • Insulin Glargine (LANTUS SOLOSTAR) 100 UNIT/ML injection pen Inject 36 Units under the skin Every Night. 4 pen 5   • Insulin Pen Needle (CLICKFINE PEN NEEDLES) 31G X 6 MM misc 1 Device 3 (Three) Times a Day. DX E11.65 300 each 3   • Lancets (FREESTYLE) lancets 1 each by Other route 2 (Two) Times a Day. DX E11.65 200 each 3     No facility-administered medications prior to visit.        Patient Active Problem List   Diagnosis   • Vitamin D deficiency   • Benign essential hypertension   • Acute bronchitis   • Acute pancreatitis   • Acute renal failure   • Acute sinusitis   • Asthenia   • Cheilosis   • Chest pain   • Drug-induced diarrhea   • Eustachian tube dysfunction   • Hyperkalemia   • Hyperlipidemia   • Influenza   • Status post heart transplant   • Type 2 diabetes mellitus, uncontrolled       Advance Care Planning:  has an advance directive - a copy has been provided and is in file    Identification of Risk Factors:  Risk factors include: weight , unhealthy diet, inactivity and increased fall risk.    Review of Systems   Constitutional: Positive for fatigue. Negative for chills and fever.   HENT: Negative for ear discharge, ear pain, sinus pressure and sore throat.    Respiratory: Negative for cough, chest tightness and shortness of breath.    Cardiovascular: Negative for chest pain, palpitations and leg swelling.   Gastrointestinal: Negative for diarrhea, nausea and vomiting.    Endocrine: Negative for polydipsia and polyphagia.   Genitourinary: Negative for frequency and urgency.   Musculoskeletal: Negative for arthralgias, back pain and myalgias.   Neurological: Negative for dizziness, syncope and headaches.   Psychiatric/Behavioral: Negative for confusion and sleep disturbance.       Compared to one year ago, the patient feels her physical health is the same.  Compared to one year ago, the patient feels her mental health is the same.    Objective     Physical Exam   Constitutional: She is oriented to person, place, and time. She appears well-developed and well-nourished.   HENT:   Head: Normocephalic and atraumatic.   Right Ear: External ear normal.   Left Ear: External ear normal.   Mouth/Throat: No oropharyngeal exudate.   Eyes: Conjunctivae are normal. Pupils are equal, round, and reactive to light.   Neck: Neck supple. No thyromegaly present.   Cardiovascular: Normal rate, regular rhythm and intact distal pulses.    Pulmonary/Chest: Effort normal and breath sounds normal.   Abdominal: Soft. Bowel sounds are normal. She exhibits no distension. There is no tenderness.   Musculoskeletal: She exhibits no edema.    Pamella had a diabetic foot exam performed today.   During the foot exam she had a monofilament test performed (normal).  Neurological: She is alert and oriented to person, place, and time. No cranial nerve deficit.   Skin: Skin is warm and dry.   Psychiatric: She has a normal mood and affect. Judgment normal.   Nursing note and vitals reviewed.      Vitals:    11/07/17 1444   BP: 124/86   Pulse: 98   SpO2: 99%   Weight: 131 lb (59.4 kg)       Body mass index is 25.58 kg/(m^2).  Discussed the patient's BMI with her. The BMI is in the acceptable range.  Pamella was seen today for subsequent medicare wellness.    Diagnoses and all orders for this visit:    Medicare annual wellness visit, subsequent  -     pneumococcal polysaccharide 23-valent (PNEUMOVAX-23) vaccine 0.5 mL; Inject  0.5 mL into the shoulder, thigh, or buttocks During Hospitalization for Immunization.    Flu vaccine need    Uncontrolled type 2 diabetes mellitus with complication, with long-term current use of insulin  -     Insulin Glargine (LANTUS SOLOSTAR) 100 UNIT/ML injection pen; Inject 36 Units under the skin Every Night.  -     insulin aspart (novoLOG) 100 UNIT/ML injection; 5 Units before breakfast and lunch, 10 units before dinner & per sliding scale  -     Blood Glucose Monitoring Suppl (FREESTYLE FREEDOM LITE) w/Device kit; Use 1 as directed daily  -     Lancets (FREESTYLE) lancets; 1 each by Other route 2 (Two) Times a Day. DX E11.65  -     Insulin Pen Needle (BoostableFINE PEN NEEDLES) 31G X 6 MM misc; 1 Device 3 (Three) Times a Day. DX E11.65  -     glucose blood test strip; 1 each by Other route 2 (Two) Times a Day. DX E11.65  -     Hemoglobin A1c    Benign essential hypertension    Status post heart transplant    Mixed hyperlipidemia  -     Comprehensive Metabolic Panel  -     Lipid Panel    Other orders  -     Flu Vaccine Quad PF 3YR+      Assessment/Plan   Patient Self-Management and Personalized Health Advice  The patient has been provided with information about: diet, exercise, weight management, the relationship between weight and GERD, fall prevention and supplements and preventive services including:   · Diabetes screening, see lab orders, Exercise counseling provided, Fall Risk assessment done, Influenza vaccine, Nutrition counseling provided, Pneumococcal vaccine , Screening mammography, referral placed.    Visit Diagnoses:    ICD-10-CM ICD-9-CM   1. Medicare annual wellness visit, subsequent Z00.00 V70.0   2. Flu vaccine need Z23 V04.81   3. Uncontrolled type 2 diabetes mellitus with complication, with long-term current use of insulin E11.8 250.82    E11.65 V58.67    Z79.4    4. Benign essential hypertension I10 401.1   5. Status post heart transplant Z94.1 V42.1   6. Mixed hyperlipidemia E78.2 272.2        Orders Placed This Encounter   Procedures   • Flu Vaccine Quad PF 3YR+   • Comprehensive Metabolic Panel   • Lipid Panel   • Hemoglobin A1c       Outpatient Encounter Prescriptions as of 11/7/2017   Medication Sig Dispense Refill   • albuterol (PROVENTIL HFA;VENTOLIN HFA) 108 (90 BASE) MCG/ACT inhaler Inhale 2 puffs Every 6 (Six) Hours As Needed for Wheezing. 18 g 0   • amLODIPine (NORVASC) 5 MG tablet Take  by mouth.     • aspirin (ASPIRIN EC LO-DOSE) 81 MG EC tablet Take  by mouth.     • azelastine (ASTELIN) 0.1 % nasal spray into each nostril.     • azithromycin (ZITHROMAX) 250 MG tablet Take 2 tablets the first day, then 1 tablet daily for 4 days. 6 tablet 0   • benzonatate (TESSALON) 200 MG capsule Take 1 capsule by mouth 3 (Three) Times a Day As Needed for Cough. 30 capsule 0   • bumetanide (BUMEX) 1 MG tablet Take  by mouth.     • Cholecalciferol (VITAMIN D3) 5000 UNITS capsule capsule Take 1 capsule by mouth Daily. 30 capsule 5   • clopidogrel (PLAVIX) 75 MG tablet Take 75 mg by mouth daily.     • estradiol (VIVELLE-DOT) 0.0375 MG/24HR Place  on the skin.     • fluticasone (FLONASE) 50 MCG/ACT nasal spray into each nostril.     • fluticasone-salmeterol (ADVAIR DISKUS) 250-50 MCG/DOSE DISKUS Inhale 1 puff Every 12 (Twelve) Hours. 60 each 5   • glucose blood test strip 1 each by Other route 2 (Two) Times a Day. DX E11.65 200 each 3   • insulin aspart (novoLOG) 100 UNIT/ML injection 5 Units before breakfast and lunch, 10 units before dinner & per sliding scale 5 each 5   • Insulin Glargine (LANTUS SOLOSTAR) 100 UNIT/ML injection pen Inject 36 Units under the skin Every Night. 4 pen 5   • Insulin Pen Needle (CLICKFINE PEN NEEDLES) 31G X 6 MM misc 1 Device 3 (Three) Times a Day. DX E11.65 300 each 3   • Lancets (FREESTYLE) lancets 1 each by Other route 2 (Two) Times a Day. DX E11.65 200 each 3   • lansoprazole (PREVACID) 30 MG capsule Take  by mouth daily.     • Magnesium 250 MG tablet Take 1 tablet by  mouth Daily. 30 each 1   • metoprolol tartrate (LOPRESSOR) 100 MG tablet Take 100 mg by mouth 2 (two) times a day.     • montelukast (SINGULAIR) 10 MG tablet Take  by mouth.     • potassium chloride (K-DUR,KLOR-CON) 20 MEQ CR tablet Take  by mouth.     • predniSONE (DELTASONE) 2.5 MG tablet Take 2 mg by mouth Daily.     • promethazine (PHENERGAN) 25 MG tablet Take  by mouth.     • Riboflavin 100 MG capsule Take 1 capsule by mouth Daily. 30 each 2   • sirolimus (RAPAMUNE) 1 MG tablet Take 2 mg by mouth Daily.     • tacrolimus (PROGRAF) 1 MG capsule Take  by mouth.     • [DISCONTINUED] glucose blood test strip 1 each by Other route 2 (Two) Times a Day. DX E11.65 200 each 3   • [DISCONTINUED] insulin aspart (novoLOG) 100 UNIT/ML injection 5 Units before breakfast and lunch, 10 units before dinner & per sliding scale 5 each 5   • [DISCONTINUED] Insulin Glargine (LANTUS SOLOSTAR) 100 UNIT/ML injection pen Inject 36 Units under the skin Every Night. 4 pen 5   • [DISCONTINUED] Insulin Pen Needle (CLICKFINE PEN NEEDLES) 31G X 6 MM misc 1 Device 3 (Three) Times a Day. DX E11.65 300 each 3   • [DISCONTINUED] Lancets (FREESTYLE) lancets 1 each by Other route 2 (Two) Times a Day. DX E11.65 200 each 3   • Blood Glucose Monitoring Suppl (FREESTYLE FREEDOM LITE) w/Device kit Use 1 as directed daily 1 each 2   • [] pneumococcal polysaccharide 23-valent (PNEUMOVAX-23) vaccine 0.5 mL        No facility-administered encounter medications on file as of 2017.        Reviewed use of high risk medication in the elderly: yes  Reviewed for potential of harmful drug interactions in the elderly: yes    Follow Up:  Return in about 4 months (around 3/7/2018) for Next scheduled follow up- end march.     An After Visit Summary and PPPS with all of these plans were given to the patient.

## 2017-11-08 NOTE — TELEPHONE ENCOUNTER
THEY NEED CLARIFICATION ON PATIENTS NOVALOG PRESCRIPTION. THEY NEED TO SEE IF WE WANT THIS IN A QUICK PIN OR A VALVE. YOU CAN REACH THEM BACK [phone]

## 2017-11-08 NOTE — TELEPHONE ENCOUNTER
Notified pharmacy should be for the pen.    Pharmacy states that the basaglar is not covered, please advise.

## 2017-11-08 NOTE — TELEPHONE ENCOUNTER
Did u mean the basaglar is covered ? As she is currently on Lantus.  If it is, ok to change to basaglar, same dose and sig and refills.  Please notify Pt. Of the change.    thanks

## 2017-11-10 NOTE — OUTREACH NOTE
Spoke with Ms Henson for HRCM. She's well managed by PCP & specialists, independent with ADL's, no gaps in care. Will follow only as needed for HRCM.

## 2017-12-28 NOTE — PROGRESS NOTES
Chief Complaint   Patient presents with   • Cough, nasal drainage/congestion, ear pain x2 days       Subjective   Pamella Henson is a 62 y.o. female.       History of Present Illness     Pt has had chills, headache, nasal congestion. Mild cough since yesterday. Has not had fever at home. No sick contacts. She tries to stay away from people this time of year d/t her underlying chronic health conditions. No shortness of breath or wheezing.         Current Outpatient Prescriptions:   •  albuterol (PROVENTIL HFA;VENTOLIN HFA) 108 (90 BASE) MCG/ACT inhaler, Inhale 2 puffs Every 6 (Six) Hours As Needed for Wheezing., Disp: 18 g, Rfl: 0  •  amLODIPine (NORVASC) 5 MG tablet, Take  by mouth., Disp: , Rfl:   •  aspirin (ASPIRIN EC LO-DOSE) 81 MG EC tablet, Take  by mouth., Disp: , Rfl:   •  azelastine (ASTELIN) 0.1 % nasal spray, into each nostril., Disp: , Rfl:   •  Blood Glucose Monitoring Suppl (FREESTYLE FREEDOM LITE) w/Device kit, Use 1 as directed daily, Disp: 1 each, Rfl: 2  •  bumetanide (BUMEX) 1 MG tablet, Take  by mouth., Disp: , Rfl:   •  Cholecalciferol (VITAMIN D3) 5000 UNITS capsule capsule, Take 1 capsule by mouth Daily., Disp: 30 capsule, Rfl: 5  •  clopidogrel (PLAVIX) 75 MG tablet, Take 75 mg by mouth daily., Disp: , Rfl:   •  estradiol (VIVELLE-DOT) 0.0375 MG/24HR, Place  on the skin., Disp: , Rfl:   •  fluticasone (FLONASE) 50 MCG/ACT nasal spray, into each nostril., Disp: , Rfl:   •  fluticasone-salmeterol (ADVAIR DISKUS) 250-50 MCG/DOSE DISKUS, Inhale 1 puff Every 12 (Twelve) Hours., Disp: 60 each, Rfl: 5  •  glucose blood test strip, 1 each by Other route 2 (Two) Times a Day. DX E11.65, Disp: 200 each, Rfl: 3  •  insulin aspart (novoLOG) 100 UNIT/ML injection, 5 Units before breakfast and lunch, 10 units before dinner & per sliding scale, Disp: 5 each, Rfl: 5  •  Insulin Glargine (LANTUS SOLOSTAR) 100 UNIT/ML injection pen, Inject 36 Units under the skin Every Night., Disp: 4 pen, Rfl: 5  •  Insulin  Pen Needle (CLICKFINE PEN NEEDLES) 31G X 6 MM misc, 1 Device 3 (Three) Times a Day. DX E11.65, Disp: 300 each, Rfl: 3  •  Lancets (FREESTYLE) lancets, 1 each by Other route 2 (Two) Times a Day. DX E11.65, Disp: 200 each, Rfl: 3  •  lansoprazole (PREVACID) 30 MG capsule, Take  by mouth daily., Disp: , Rfl:   •  Magnesium 250 MG tablet, Take 1 tablet by mouth Daily., Disp: 30 each, Rfl: 1  •  metoprolol tartrate (LOPRESSOR) 100 MG tablet, Take 100 mg by mouth 2 (two) times a day., Disp: , Rfl:   •  montelukast (SINGULAIR) 10 MG tablet, Take  by mouth., Disp: , Rfl:   •  potassium chloride (K-DUR,KLOR-CON) 20 MEQ CR tablet, Take  by mouth., Disp: , Rfl:   •  predniSONE (DELTASONE) 2.5 MG tablet, Take 2 mg by mouth Daily., Disp: , Rfl:   •  promethazine (PHENERGAN) 25 MG tablet, Take  by mouth., Disp: , Rfl:   •  Riboflavin 100 MG capsule, Take 1 capsule by mouth Daily., Disp: 30 each, Rfl: 2  •  sirolimus (RAPAMUNE) 1 MG tablet, Take 2 mg by mouth Daily., Disp: , Rfl:   •  tacrolimus (PROGRAF) 1 MG capsule, Take  by mouth., Disp: , Rfl:   •  azithromycin (ZITHROMAX Z-DIMPLE) 250 MG tablet, Take 2 tablets the first day, then 1 tablet daily for 4 days., Disp: 6 tablet, Rfl: 0  No current facility-administered medications for this visit.      [facility]  The following portions of the patient's history were reviewed and updated as appropriate: allergies, current medications, past family history, past medical history, past social history, past surgical history and problem list.    Review of Systems   Constitutional: Positive for fatigue. Negative for activity change and unexpected weight change.   HENT: Positive for congestion, postnasal drip and sore throat. Negative for ear pain.    Eyes: Negative for pain and discharge.   Respiratory: Positive for cough. Negative for chest tightness, shortness of breath and wheezing.    Cardiovascular: Negative for chest pain and palpitations.   Gastrointestinal: Negative for abdominal pain,  diarrhea and vomiting.   Endocrine: Negative.    Genitourinary: Negative.    Musculoskeletal: Negative for joint swelling.   Skin: Negative for color change, rash and wound.   Allergic/Immunologic: Negative.    Neurological: Negative for seizures and syncope.   Psychiatric/Behavioral: Negative.        Objective   /68  Pulse 105  Temp 99.8 °F (37.7 °C)  Wt 59.4 kg (131 lb)  SpO2 99%  BMI 25.58 kg/m2    Physical Exam   Constitutional: She is oriented to person, place, and time. She appears well-developed and well-nourished.  Non-toxic appearance. No distress.   HENT:   Head: Normocephalic and atraumatic. Hair is normal.   Right Ear: External ear normal. No drainage, swelling or tenderness. Tympanic membrane is retracted.   Left Ear: External ear normal. No drainage, swelling or tenderness. Tympanic membrane is retracted.   Nose: Mucosal edema present. No epistaxis.   Mouth/Throat: Uvula is midline and mucous membranes are normal. No oral lesions. No uvula swelling. Posterior oropharyngeal erythema present. No oropharyngeal exudate.   Eyes: Conjunctivae and EOM are normal. Pupils are equal, round, and reactive to light. Right eye exhibits no discharge. Left eye exhibits no discharge. No scleral icterus.   Neck: Normal range of motion. Neck supple.   Cardiovascular: Normal rate, regular rhythm and normal heart sounds.  Exam reveals no gallop.    No murmur heard.  Pulmonary/Chest: Breath sounds normal. No stridor. No respiratory distress. She has no wheezes. She has no rales. She exhibits no tenderness.   Abdominal: Soft. There is no tenderness.   Lymphadenopathy:     She has cervical adenopathy.   Neurological: She is alert and oriented to person, place, and time. She exhibits normal muscle tone.   Skin: Skin is warm and dry. No rash noted. She is not diaphoretic.   Psychiatric: She has a normal mood and affect. Her behavior is normal. Judgment and thought content normal.   Nursing note and vitals  reviewed.      Results for orders placed or performed in visit on 12/28/17   POCT Influenza A/B   Result Value Ref Range    Rapid Influenza A Ag Negative     Rapid Influenza B Ag Negative     Internal Control Passed Passed    Lot Number [id number]     Expiration Date 05/01/2019         ASSESSMENT/PLAN    Problem List Items Addressed This Visit        Respiratory    Acute sinusitis - Primary     Depomedrol 40 mg IM to help with headache. Take doxycycline as directed. Increase rest and fluids. RTC or to ER with worsening symptoms.         Relevant Medications    methylPREDNISolone acetate (DEPO-medrol) injection 40 mg (Completed)    azithromycin (ZITHROMAX Z-DIMPLE) 250 MG tablet      Other Visit Diagnoses     Flu-like symptoms        Relevant Orders    POCT Influenza A/B (Completed)               Return if symptoms worsen or fail to improve.

## 2017-12-29 NOTE — PROGRESS NOTES
I have reviewed the notes, assessments, and/or procedures performed by Chelsie Aquino , I concur with her documentation of Pamella Henson.

## 2017-12-29 NOTE — ASSESSMENT & PLAN NOTE
Depomedrol 40 mg IM to help with headache. Take doxycycline as directed. Increase rest and fluids. RTC or to ER with worsening symptoms.

## 2018-01-01 ENCOUNTER — LAB REQUISITION (OUTPATIENT)
Dept: LAB | Facility: HOSPITAL | Age: 63
End: 2018-01-01

## 2018-01-01 ENCOUNTER — EPISODE CHANGES (OUTPATIENT)
Dept: CASE MANAGEMENT | Facility: OTHER | Age: 63
End: 2018-01-01

## 2018-01-01 ENCOUNTER — TRANSITIONAL CARE MANAGEMENT TELEPHONE ENCOUNTER (OUTPATIENT)
Dept: INTERNAL MEDICINE | Facility: CLINIC | Age: 63
End: 2018-01-01

## 2018-01-01 DIAGNOSIS — Z00.00 ROUTINE GENERAL MEDICAL EXAMINATION AT A HEALTH CARE FACILITY: ICD-10-CM

## 2018-01-01 LAB
ALBUMIN SERPL-MCNC: 2.6 G/DL (ref 3.2–4.8)
ALBUMIN/GLOB SERPL: 0.8 G/DL (ref 1.5–2.5)
ALP SERPL-CCNC: 242 U/L (ref 25–100)
ALT SERPL W P-5'-P-CCNC: 20 U/L (ref 7–40)
ANION GAP SERPL CALCULATED.3IONS-SCNC: 9 MMOL/L (ref 3–11)
AST SERPL-CCNC: 47 U/L (ref 0–33)
BASOPHILS # BLD AUTO: 0.01 10*3/MM3 (ref 0–0.2)
BASOPHILS NFR BLD AUTO: 0.2 % (ref 0–1)
BILIRUB SERPL-MCNC: 0.3 MG/DL (ref 0.3–1.2)
BUN BLD-MCNC: 16 MG/DL (ref 9–23)
BUN/CREAT SERPL: 4.7 (ref 7–25)
CALCIUM SPEC-SCNC: 9.5 MG/DL (ref 8.7–10.4)
CHLORIDE SERPL-SCNC: 105 MMOL/L (ref 99–109)
CO2 SERPL-SCNC: 24 MMOL/L (ref 20–31)
CREAT BLD-MCNC: 3.4 MG/DL (ref 0.6–1.3)
DEPRECATED RDW RBC AUTO: 61.9 FL (ref 37–54)
EOSINOPHIL # BLD AUTO: 0.38 10*3/MM3 (ref 0–0.3)
EOSINOPHIL NFR BLD AUTO: 6.4 % (ref 0–3)
ERYTHROCYTE [DISTWIDTH] IN BLOOD BY AUTOMATED COUNT: 18.9 % (ref 11.3–14.5)
GFR SERPL CREATININE-BSD FRML MDRD: 17 ML/MIN/1.73
GLOBULIN UR ELPH-MCNC: 3.2 GM/DL
GLUCOSE BLD-MCNC: 115 MG/DL (ref 70–100)
HCT VFR BLD AUTO: 27.7 % (ref 34.5–44)
HGB BLD-MCNC: 8.8 G/DL (ref 11.5–15.5)
IMM GRANULOCYTES # BLD: 0.04 10*3/MM3 (ref 0–0.03)
IMM GRANULOCYTES NFR BLD: 0.7 % (ref 0–0.6)
LYMPHOCYTES # BLD AUTO: 0.88 10*3/MM3 (ref 0.6–4.8)
LYMPHOCYTES NFR BLD AUTO: 14.7 % (ref 24–44)
MAGNESIUM SERPL-MCNC: 2.9 MG/DL (ref 1.3–2.7)
MCH RBC QN AUTO: 29.4 PG (ref 27–31)
MCHC RBC AUTO-ENTMCNC: 31.8 G/DL (ref 32–36)
MCV RBC AUTO: 92.6 FL (ref 80–99)
MONOCYTES # BLD AUTO: 0.68 10*3/MM3 (ref 0–1)
MONOCYTES NFR BLD AUTO: 11.4 % (ref 0–12)
NEUTROPHILS # BLD AUTO: 4.02 10*3/MM3 (ref 1.5–8.3)
NEUTROPHILS NFR BLD AUTO: 67.3 % (ref 41–71)
PHOSPHATE SERPL-MCNC: 2 MG/DL (ref 2.4–5.1)
PLATELET # BLD AUTO: 271 10*3/MM3 (ref 150–450)
PMV BLD AUTO: 10 FL (ref 6–12)
POTASSIUM BLD-SCNC: 3.3 MMOL/L (ref 3.5–5.5)
PROT SERPL-MCNC: 5.8 G/DL (ref 5.7–8.2)
RBC # BLD AUTO: 2.99 10*6/MM3 (ref 3.89–5.14)
SODIUM BLD-SCNC: 138 MMOL/L (ref 132–146)
WBC NRBC COR # BLD: 5.97 10*3/MM3 (ref 3.5–10.8)

## 2018-01-01 PROCEDURE — 80053 COMPREHEN METABOLIC PANEL: CPT

## 2018-01-01 PROCEDURE — 83735 ASSAY OF MAGNESIUM: CPT

## 2018-01-01 PROCEDURE — 84100 ASSAY OF PHOSPHORUS: CPT

## 2018-01-01 PROCEDURE — 85025 COMPLETE CBC W/AUTO DIFF WBC: CPT

## 2018-05-25 NOTE — OUTREACH NOTE
The patient is still inpatient at [facility] as of 5/25/18.  TCM encounter closed at this time.  The patient will notify PCP office when she is discharged from [facility].